# Patient Record
Sex: MALE | Race: BLACK OR AFRICAN AMERICAN | Employment: FULL TIME | URBAN - METROPOLITAN AREA
[De-identification: names, ages, dates, MRNs, and addresses within clinical notes are randomized per-mention and may not be internally consistent; named-entity substitution may affect disease eponyms.]

---

## 2023-07-21 LAB — HBA1C MFR BLD HPLC: 6.9 %

## 2023-08-01 ENCOUNTER — OFFICE VISIT (OUTPATIENT)
Age: 53
End: 2023-08-01
Payer: OTHER GOVERNMENT

## 2023-08-01 VITALS
SYSTOLIC BLOOD PRESSURE: 144 MMHG | HEART RATE: 75 BPM | OXYGEN SATURATION: 96 % | HEIGHT: 73 IN | BODY MASS INDEX: 28.97 KG/M2 | WEIGHT: 218.6 LBS | DIASTOLIC BLOOD PRESSURE: 84 MMHG | RESPIRATION RATE: 18 BRPM | TEMPERATURE: 98.2 F

## 2023-08-01 DIAGNOSIS — I10 PRIMARY HYPERTENSION: ICD-10-CM

## 2023-08-01 DIAGNOSIS — Z11.59 ENCOUNTER FOR HEPATITIS C SCREENING TEST FOR LOW RISK PATIENT: Primary | ICD-10-CM

## 2023-08-01 DIAGNOSIS — E11.9 TYPE 2 DIABETES MELLITUS WITHOUT COMPLICATION, WITHOUT LONG-TERM CURRENT USE OF INSULIN (HCC): ICD-10-CM

## 2023-08-01 DIAGNOSIS — Z12.5 PROSTATE CANCER SCREENING: ICD-10-CM

## 2023-08-01 DIAGNOSIS — Z00.00 ANNUAL PHYSICAL EXAM: ICD-10-CM

## 2023-08-01 PROCEDURE — 3079F DIAST BP 80-89 MM HG: CPT | Performed by: NURSE PRACTITIONER

## 2023-08-01 PROCEDURE — 99386 PREV VISIT NEW AGE 40-64: CPT | Performed by: NURSE PRACTITIONER

## 2023-08-01 PROCEDURE — 3077F SYST BP >= 140 MM HG: CPT | Performed by: NURSE PRACTITIONER

## 2023-08-01 RX ORDER — ASPIRIN 81 MG/1
81 TABLET, CHEWABLE ORAL DAILY
COMMUNITY

## 2023-08-01 RX ORDER — AMLODIPINE BESYLATE 10 MG/1
10 TABLET ORAL DAILY
COMMUNITY

## 2023-08-01 SDOH — ECONOMIC STABILITY: FOOD INSECURITY: WITHIN THE PAST 12 MONTHS, THE FOOD YOU BOUGHT JUST DIDN'T LAST AND YOU DIDN'T HAVE MONEY TO GET MORE.: PATIENT DECLINED

## 2023-08-01 SDOH — ECONOMIC STABILITY: FOOD INSECURITY: WITHIN THE PAST 12 MONTHS, YOU WORRIED THAT YOUR FOOD WOULD RUN OUT BEFORE YOU GOT MONEY TO BUY MORE.: NEVER TRUE

## 2023-08-01 SDOH — ECONOMIC STABILITY: INCOME INSECURITY: HOW HARD IS IT FOR YOU TO PAY FOR THE VERY BASICS LIKE FOOD, HOUSING, MEDICAL CARE, AND HEATING?: NOT VERY HARD

## 2023-08-01 SDOH — ECONOMIC STABILITY: HOUSING INSECURITY
IN THE LAST 12 MONTHS, WAS THERE A TIME WHEN YOU DID NOT HAVE A STEADY PLACE TO SLEEP OR SLEPT IN A SHELTER (INCLUDING NOW)?: NO

## 2023-08-01 ASSESSMENT — ENCOUNTER SYMPTOMS
VOMITING: 0
NAUSEA: 0
ABDOMINAL PAIN: 0
SHORTNESS OF BREATH: 0
CHEST TIGHTNESS: 0
COUGH: 0

## 2023-08-01 ASSESSMENT — PATIENT HEALTH QUESTIONNAIRE - PHQ9
1. LITTLE INTEREST OR PLEASURE IN DOING THINGS: 0
SUM OF ALL RESPONSES TO PHQ9 QUESTIONS 1 & 2: 0
SUM OF ALL RESPONSES TO PHQ QUESTIONS 1-9: 0
2. FEELING DOWN, DEPRESSED OR HOPELESS: 0
SUM OF ALL RESPONSES TO PHQ QUESTIONS 1-9: 0

## 2023-12-27 PROBLEM — G43.109 MIGRAINE WITH AURA, NOT INTRACTABLE, WITHOUT STATUS MIGRAINOSUS: Status: ACTIVE | Noted: 2017-12-14

## 2023-12-27 PROBLEM — E11.9 TYPE II DIABETES MELLITUS (HCC): Status: ACTIVE | Noted: 2023-05-31

## 2023-12-27 PROBLEM — G47.33 OBSTRUCTIVE SLEEP APNEA (ADULT) (PEDIATRIC): Status: ACTIVE | Noted: 2017-04-26

## 2023-12-27 PROBLEM — K21.9 GASTRO-ESOPHAGEAL REFLUX DISEASE WITHOUT ESOPHAGITIS: Status: ACTIVE | Noted: 2023-12-27

## 2023-12-27 PROBLEM — J45.990 EXERCISE-INDUCED ASTHMA: Status: ACTIVE | Noted: 2023-12-27

## 2023-12-27 PROBLEM — N52.9 MALE ERECTILE DISORDER: Status: ACTIVE | Noted: 2017-06-26

## 2023-12-27 PROBLEM — I10 ESSENTIAL (PRIMARY) HYPERTENSION: Status: RESOLVED | Noted: 2023-12-27 | Resolved: 2023-12-27

## 2023-12-27 PROBLEM — M19.011 PRIMARY OSTEOARTHRITIS, RIGHT SHOULDER: Status: ACTIVE | Noted: 2017-12-14

## 2023-12-27 PROBLEM — E78.5 HYPERLIPIDEMIA: Status: ACTIVE | Noted: 2023-12-27

## 2024-03-13 LAB
ESTIMATED AVERAGE GLUCOSE: NORMAL
HBA1C MFR BLD: 7.7 %

## 2024-11-11 ENCOUNTER — OFFICE VISIT (OUTPATIENT)
Facility: CLINIC | Age: 54
End: 2024-11-11
Payer: OTHER GOVERNMENT

## 2024-11-11 VITALS
TEMPERATURE: 97.7 F | SYSTOLIC BLOOD PRESSURE: 131 MMHG | WEIGHT: 222 LBS | DIASTOLIC BLOOD PRESSURE: 85 MMHG | HEART RATE: 70 BPM | BODY MASS INDEX: 29.42 KG/M2 | OXYGEN SATURATION: 98 % | HEIGHT: 73 IN | RESPIRATION RATE: 20 BRPM

## 2024-11-11 DIAGNOSIS — S39.012A ACUTE MYOFASCIAL STRAIN OF LUMBAR REGION, INITIAL ENCOUNTER: Primary | ICD-10-CM

## 2024-11-11 PROCEDURE — 99212 OFFICE O/P EST SF 10 MIN: CPT | Performed by: NURSE PRACTITIONER

## 2024-11-11 RX ORDER — MELOXICAM 7.5 MG/1
7.5 TABLET ORAL 2 TIMES DAILY PRN
COMMUNITY
Start: 2024-03-13

## 2024-11-11 RX ORDER — IBUPROFEN 800 MG/1
800 TABLET, FILM COATED ORAL EVERY 8 HOURS PRN
COMMUNITY

## 2024-11-11 RX ORDER — LIDOCAINE 50 MG/G
1 PATCH TOPICAL EVERY 24 HOURS
COMMUNITY
Start: 2024-08-14

## 2024-11-11 RX ORDER — GABAPENTIN 100 MG/1
100 CAPSULE ORAL 3 TIMES DAILY
COMMUNITY
Start: 2024-08-14

## 2024-11-11 RX ORDER — SILDENAFIL 100 MG/1
100 TABLET, FILM COATED ORAL PRN
COMMUNITY
Start: 2024-02-16

## 2024-11-11 RX ORDER — ESCITALOPRAM OXALATE 20 MG/1
10 TABLET ORAL DAILY
COMMUNITY
Start: 2024-10-28

## 2024-11-11 RX ORDER — SUMATRIPTAN 50 MG/1
50 TABLET, FILM COATED ORAL
COMMUNITY
Start: 2024-02-16

## 2024-11-11 RX ORDER — METHOCARBAMOL 500 MG/1
500 TABLET, FILM COATED ORAL 3 TIMES DAILY
COMMUNITY
Start: 2024-02-16

## 2024-11-11 SDOH — ECONOMIC STABILITY: INCOME INSECURITY: HOW HARD IS IT FOR YOU TO PAY FOR THE VERY BASICS LIKE FOOD, HOUSING, MEDICAL CARE, AND HEATING?: NOT VERY HARD

## 2024-11-11 SDOH — ECONOMIC STABILITY: FOOD INSECURITY: WITHIN THE PAST 12 MONTHS, YOU WORRIED THAT YOUR FOOD WOULD RUN OUT BEFORE YOU GOT MONEY TO BUY MORE.: NEVER TRUE

## 2024-11-11 SDOH — ECONOMIC STABILITY: FOOD INSECURITY: WITHIN THE PAST 12 MONTHS, THE FOOD YOU BOUGHT JUST DIDN'T LAST AND YOU DIDN'T HAVE MONEY TO GET MORE.: NEVER TRUE

## 2024-11-11 ASSESSMENT — PATIENT HEALTH QUESTIONNAIRE - PHQ9
SUM OF ALL RESPONSES TO PHQ QUESTIONS 1-9: 0
SUM OF ALL RESPONSES TO PHQ QUESTIONS 1-9: 0
SUM OF ALL RESPONSES TO PHQ9 QUESTIONS 1 & 2: 0
SUM OF ALL RESPONSES TO PHQ QUESTIONS 1-9: 0
SUM OF ALL RESPONSES TO PHQ QUESTIONS 1-9: 0
1. LITTLE INTEREST OR PLEASURE IN DOING THINGS: NOT AT ALL
2. FEELING DOWN, DEPRESSED OR HOPELESS: NOT AT ALL

## 2024-11-11 NOTE — PROGRESS NOTES
\"ALT\", \"LABGLOM\", \"GFRAA\", \"AGRATIO\", \"GLOB\"  No results found for: \"TSH\", \"TSHFT4\", \"TSHELE\", \"[id number]\", \"TSHHS\"  Hemoglobin A1C   Date Value Ref Range Status   03/13/2024 7.7 % Final     No results found for: \"CHOL\"  No results found for: \"TRIG\"  No results found for: \"HDL\"  No components found for: \"LDLCHOLESTEROL\", \"LDLCALC\"  No results found for: \"VLDL\"  No results found for: \"CHOLHDLRATIO\"      Physical Exam  Constitutional:       General: He is not in acute distress.     Appearance: Normal appearance.   HENT:      Head: Normocephalic and atraumatic.   Pulmonary:      Effort: Pulmonary effort is normal.   Musculoskeletal:      Cervical back: Normal.      Thoracic back: Normal.      Lumbar back: Tenderness present. No bony tenderness. Decreased range of motion. Negative right straight leg raise test and negative left straight leg raise test.   Neurological:      General: No focal deficit present.      Mental Status: He is alert and oriented to person, place, and time.   Psychiatric:         Mood and Affect: Mood normal.         Behavior: Behavior normal.         Thought Content: Thought content normal.         Judgment: Judgment normal.         Disclaimer:    The patient understands our medical plan.  Alternatives have been explained and offered.  The risks, benefits and significant side effects of all medications have been reviewed. Anticipated time course and progression of condition reviewed. All questions have been addressed.  He is encouraged to employ the information provided in the after visit summary, which was reviewed.      Where applicable, he is instructed to call the clinic if he has not been notified either by phone or through [employer] with the results of his tests/labs or with an appointment plan for any referrals within 1 week(s). The patient is to call if his condition worsens or fails to improve or if significant side effects are experienced.     Please note that this dictation was completed

## 2024-11-19 ASSESSMENT — ENCOUNTER SYMPTOMS
ABDOMINAL PAIN: 0
NAUSEA: 0
SHORTNESS OF BREATH: 0
CHEST TIGHTNESS: 0
BACK PAIN: 1
VOMITING: 0
COUGH: 0

## 2025-02-18 ENCOUNTER — COMMUNITY OUTREACH (OUTPATIENT)
Facility: CLINIC | Age: 55
End: 2025-02-18

## 2025-03-12 LAB
ALBUMIN URINE, EXTERNAL: 1.1
CHOLESTEROL, TOTAL: 171 MG/DL
CHOLESTEROL/HDL RATIO: NORMAL
CREATININE, URINE, EXTERNAL: 54.7
ESTIMATED AVERAGE GLUCOSE: NORMAL
HBA1C MFR BLD: 7.3 %
HDLC SERPL-MCNC: 49 MG/DL (ref 35–70)
LDL CHOLESTEROL: NORMAL
MICROALBUMIN/CREAT UR: 20.1 MG/G{CREAT}
NONHDLC SERPL-MCNC: NORMAL MG/DL
TRIGL SERPL-MCNC: 195 MG/DL
VLDLC SERPL CALC-MCNC: NORMAL MG/DL

## 2025-05-09 NOTE — PROGRESS NOTES
HEMOGLOBIN A1C(POST11/14/90) HEMOGLOBIN A1C/HEMOGLOBIN.TOTAL IN BLOOD 7.3 4.2 - 5.9 03/12/2025 H     LIPID PANEL (ATELLICA) CHOLESTEROL [MASS/VOLUME] IN SERUM OR PLASMA 171 mg/dL &lt;200 - 200 03/12/2025     Specimen Type: SERUM  No comment entered.  Ordering Provider: BETHANIE RIVERA  Report Released Date/Time: Mar 07, 2025 12:46 PM  Reporting Lab: [address]  Performing Lab: [address] [facility]   LIPID PANEL (ATELLICA) TRIGLYCERIDE [MASS/VOLUME] IN SERUM OR PLASMA 195 mg/dL   03/12/2025     Specimen Type: SERUM  No comment entered.  Ordering Provider: BETHANIE RIVERA  Report Released Date/Time: Mar 07, 2025 12:46 PM  Reporting Lab: [address]  Performing Lab: [address] [facility]   LIPID PANEL (ATELLICA) LIPOPROTEIN.BETA [MASS/VOLUME] IN SERUM OR PLASMA BY CALCULATION 82.8 mg/dL 0 - 100 03/12/2025     Specimen Type: SERUM  No comment entered.  Ordering Provider: BETHANIE RIVERA  Report Released Date/Time: Mar 07, 2025 12:46 PM  Reporting Lab: [address]  Performing Lab: [address] [facility]   LIPID PANEL (ATELLICA) CHOLESTEROL IN HDL [MASS/VOLUME] IN SERUM OR PLASMA 49.2 mg/dL 40 03/12/2025     Specimen Type: SERUM  No comment entered.  Ordering Provider: BETHANIE RIVERA  Report Released Date/Time: Mar 07, 2025 12:46 PM  Reporting Lab: [address]  [place]        MICRO/CREAT RATIO PANEL (ATELLICA) ALBUMIN [MASS/VOLUME] IN URINE 1.10 mg/dL 0.5 - 2.9 03/12/2025     Specimen Type: URINE  No comment entered.  Ordering Provider: BETHANIE RIVERA